# Patient Record
Sex: MALE | Race: WHITE
[De-identification: names, ages, dates, MRNs, and addresses within clinical notes are randomized per-mention and may not be internally consistent; named-entity substitution may affect disease eponyms.]

---

## 2017-05-30 ENCOUNTER — HOSPITAL ENCOUNTER (OUTPATIENT)
Dept: HOSPITAL 61 - 5 NORTH | Age: 77
Setting detail: OBSERVATION
LOS: 3 days | Discharge: HOME | End: 2017-06-02
Attending: INTERNAL MEDICINE | Admitting: INTERNAL MEDICINE
Payer: MEDICARE

## 2017-05-30 VITALS — SYSTOLIC BLOOD PRESSURE: 95 MMHG | DIASTOLIC BLOOD PRESSURE: 47 MMHG

## 2017-05-30 VITALS — SYSTOLIC BLOOD PRESSURE: 109 MMHG | DIASTOLIC BLOOD PRESSURE: 58 MMHG

## 2017-05-30 VITALS — DIASTOLIC BLOOD PRESSURE: 52 MMHG | SYSTOLIC BLOOD PRESSURE: 109 MMHG

## 2017-05-30 VITALS — BODY MASS INDEX: 19.1 KG/M2 | HEIGHT: 68 IN | WEIGHT: 126 LBS

## 2017-05-30 DIAGNOSIS — R63.4: ICD-10-CM

## 2017-05-30 DIAGNOSIS — Z79.899: ICD-10-CM

## 2017-05-30 DIAGNOSIS — K21.9: ICD-10-CM

## 2017-05-30 DIAGNOSIS — I10: ICD-10-CM

## 2017-05-30 DIAGNOSIS — E87.6: ICD-10-CM

## 2017-05-30 DIAGNOSIS — E86.0: ICD-10-CM

## 2017-05-30 DIAGNOSIS — A04.7: Primary | ICD-10-CM

## 2017-05-30 DIAGNOSIS — Z87.442: ICD-10-CM

## 2017-05-30 LAB
ALBUMIN SERPL-MCNC: 3 G/DL (ref 3.4–5)
ALBUMIN/GLOB SERPL: 0.8 {RATIO} (ref 1–1.7)
ALP SERPL-CCNC: 77 U/L (ref 46–116)
ALT SERPL-CCNC: 18 U/L (ref 16–63)
ANION GAP SERPL CALC-SCNC: 8 MMOL/L (ref 6–14)
AST SERPL-CCNC: 18 U/L (ref 15–37)
BACTERIA #/AREA URNS HPF: 0 /HPF
BASOPHILS # BLD AUTO: 0 X10^3/UL (ref 0–0.2)
BASOPHILS NFR BLD: 0 % (ref 0–3)
BILIRUB SERPL-MCNC: 0.7 MG/DL (ref 0.2–1)
BILIRUB UR QL STRIP: (no result)
BUN SERPL-MCNC: 15 MG/DL (ref 8–26)
BUN/CREAT SERPL: 12 (ref 6–20)
CALCIUM SERPL-MCNC: 8.4 MG/DL (ref 8.5–10.1)
CHLORIDE SERPL-SCNC: 101 MMOL/L (ref 98–107)
CO2 SERPL-SCNC: 29 MMOL/L (ref 21–32)
CREAT SERPL-MCNC: 1.3 MG/DL (ref 0.7–1.3)
EOSINOPHIL NFR BLD: 0 % (ref 0–3)
ERYTHROCYTE [DISTWIDTH] IN BLOOD BY AUTOMATED COUNT: 12.6 % (ref 11.5–14.5)
GFR SERPLBLD BASED ON 1.73 SQ M-ARVRAT: 53.8 ML/MIN
GLOBULIN SER-MCNC: 3.6 G/DL (ref 2.2–3.8)
GLUCOSE SERPL-MCNC: 104 MG/DL (ref 70–99)
GLUCOSE UR STRIP-MCNC: NEGATIVE MG/DL
HCT VFR BLD CALC: 34.2 % (ref 39–53)
HGB BLD-MCNC: 11.9 G/DL (ref 13–17.5)
LYMPHOCYTES # BLD: 1.3 X10^3/UL (ref 1–4.8)
LYMPHOCYTES NFR BLD AUTO: 11 % (ref 24–48)
MCH RBC QN AUTO: 30 PG (ref 25–35)
MCHC RBC AUTO-ENTMCNC: 35 G/DL (ref 31–37)
MCV RBC AUTO: 85 FL (ref 79–100)
MONOCYTES NFR BLD: 18 % (ref 0–9)
NEUTROPHILS NFR BLD AUTO: 71 % (ref 31–73)
NITRITE UR QL STRIP: NEGATIVE
PH UR STRIP: 5.5 [PH]
PLATELET # BLD AUTO: 284 X10^3/UL (ref 140–400)
POTASSIUM SERPL-SCNC: 4.2 MMOL/L (ref 3.5–5.1)
PROT SERPL-MCNC: 6.6 G/DL (ref 6.4–8.2)
PROT UR STRIP-MCNC: 30 MG/DL
RBC # BLD AUTO: 4.01 X10^6/UL (ref 4.3–5.7)
RBC #/AREA URNS HPF: 0 /HPF (ref 0–2)
SODIUM SERPL-SCNC: 138 MMOL/L (ref 136–145)
SP GR UR STRIP: 1.02
UROBILINOGEN UR-MCNC: 1 MG/DL
WBC # BLD AUTO: 12.2 X10^3/UL (ref 4–11)
WBC #/AREA URNS HPF: (no result) /HPF (ref 0–4)

## 2017-05-30 PROCEDURE — 80053 COMPREHEN METABOLIC PANEL: CPT

## 2017-05-30 PROCEDURE — 87045 FECES CULTURE AEROBIC BACT: CPT

## 2017-05-30 PROCEDURE — 71010: CPT

## 2017-05-30 PROCEDURE — 80048 BASIC METABOLIC PNL TOTAL CA: CPT

## 2017-05-30 PROCEDURE — 80162 ASSAY OF DIGOXIN TOTAL: CPT

## 2017-05-30 PROCEDURE — 96366 THER/PROPH/DIAG IV INF ADDON: CPT

## 2017-05-30 PROCEDURE — 83735 ASSAY OF MAGNESIUM: CPT

## 2017-05-30 PROCEDURE — 81001 URINALYSIS AUTO W/SCOPE: CPT

## 2017-05-30 PROCEDURE — 93005 ELECTROCARDIOGRAM TRACING: CPT

## 2017-05-30 PROCEDURE — 96361 HYDRATE IV INFUSION ADD-ON: CPT

## 2017-05-30 PROCEDURE — 84443 ASSAY THYROID STIM HORMONE: CPT

## 2017-05-30 PROCEDURE — 36415 COLL VENOUS BLD VENIPUNCTURE: CPT

## 2017-05-30 PROCEDURE — 96365 THER/PROPH/DIAG IV INF INIT: CPT

## 2017-05-30 PROCEDURE — 87040 BLOOD CULTURE FOR BACTERIA: CPT

## 2017-05-30 PROCEDURE — G0379 DIRECT REFER HOSPITAL OBSERV: HCPCS

## 2017-05-30 PROCEDURE — 85027 COMPLETE CBC AUTOMATED: CPT

## 2017-05-30 PROCEDURE — 96367 TX/PROPH/DG ADDL SEQ IV INF: CPT

## 2017-05-30 PROCEDURE — 87324 CLOSTRIDIUM AG IA: CPT

## 2017-05-30 PROCEDURE — G0378 HOSPITAL OBSERVATION PER HR: HCPCS

## 2017-05-30 PROCEDURE — 87329 GIARDIA AG IA: CPT

## 2017-05-30 RX ADMIN — ACETAMINOPHEN PRN MG: 325 TABLET, FILM COATED ORAL at 19:53

## 2017-05-30 RX ADMIN — PROPRANOLOL HYDROCHLORIDE SCH MG: 10 TABLET ORAL at 21:00

## 2017-05-30 RX ADMIN — DEXTROSE AND SODIUM CHLORIDE SCH MLS/HR: 5; .45 INJECTION, SOLUTION INTRAVENOUS at 17:03

## 2017-05-30 RX ADMIN — FAMOTIDINE SCH MG: 20 TABLET ORAL at 19:54

## 2017-05-30 NOTE — RAD
Exam:  AP portable chest. 



History:  Weight loss.



Comparison:  None.



Findings:  The heart and mediastinal structures are within normal limits for

size.  Lungs are without infiltrate.  No pneumothorax or pleural effusion is

appreciated.  Lung fields appear hyperinflated, may indicate obstructive lung

disease/emphysema.



Impression:  

1.  No acute cardiopulmonary process.  

2.  Hyperinflated lung fields.

## 2017-05-31 VITALS — DIASTOLIC BLOOD PRESSURE: 50 MMHG | SYSTOLIC BLOOD PRESSURE: 107 MMHG

## 2017-05-31 VITALS — DIASTOLIC BLOOD PRESSURE: 44 MMHG | SYSTOLIC BLOOD PRESSURE: 96 MMHG

## 2017-05-31 VITALS — SYSTOLIC BLOOD PRESSURE: 100 MMHG | DIASTOLIC BLOOD PRESSURE: 47 MMHG

## 2017-05-31 VITALS — SYSTOLIC BLOOD PRESSURE: 104 MMHG | DIASTOLIC BLOOD PRESSURE: 44 MMHG

## 2017-05-31 VITALS — SYSTOLIC BLOOD PRESSURE: 108 MMHG | DIASTOLIC BLOOD PRESSURE: 54 MMHG

## 2017-05-31 VITALS — DIASTOLIC BLOOD PRESSURE: 46 MMHG | SYSTOLIC BLOOD PRESSURE: 95 MMHG

## 2017-05-31 LAB
ANION GAP SERPL CALC-SCNC: 8 MMOL/L (ref 6–14)
BUN SERPL-MCNC: 12 MG/DL (ref 8–26)
CALCIUM SERPL-MCNC: 8.1 MG/DL (ref 8.5–10.1)
CHLORIDE SERPL-SCNC: 102 MMOL/L (ref 98–107)
CO2 SERPL-SCNC: 28 MMOL/L (ref 21–32)
CREAT SERPL-MCNC: 1.1 MG/DL (ref 0.7–1.3)
GFR SERPLBLD BASED ON 1.73 SQ M-ARVRAT: 65.3 ML/MIN
GLUCOSE SERPL-MCNC: 129 MG/DL (ref 70–99)
MAGNESIUM SERPL-MCNC: 2 MG/DL (ref 1.8–2.4)
POTASSIUM SERPL-SCNC: 3.4 MMOL/L (ref 3.5–5.1)
SODIUM SERPL-SCNC: 138 MMOL/L (ref 136–145)

## 2017-05-31 RX ADMIN — DEXTROSE AND SODIUM CHLORIDE SCH MLS/HR: 5; .45 INJECTION, SOLUTION INTRAVENOUS at 05:17

## 2017-05-31 RX ADMIN — FIDAXOMICIN SCH MG: 200 TABLET, FILM COATED ORAL at 20:42

## 2017-05-31 RX ADMIN — DEXTROSE, SODIUM CHLORIDE, AND POTASSIUM CHLORIDE SCH MLS/HR: 5; .45; .15 INJECTION INTRAVENOUS at 11:00

## 2017-05-31 RX ADMIN — PROPRANOLOL HYDROCHLORIDE SCH MG: 10 TABLET ORAL at 09:00

## 2017-05-31 RX ADMIN — FAMOTIDINE SCH MG: 20 TABLET ORAL at 20:42

## 2017-05-31 RX ADMIN — PROPRANOLOL HYDROCHLORIDE SCH MG: 10 TABLET ORAL at 20:43

## 2017-05-31 RX ADMIN — PROBIOTIC PRODUCT - TAB SCH TAB: TAB at 17:59

## 2017-05-31 RX ADMIN — FIDAXOMICIN SCH MG: 200 TABLET, FILM COATED ORAL at 12:32

## 2017-05-31 RX ADMIN — PROBIOTIC PRODUCT - TAB SCH TAB: TAB at 11:00

## 2017-05-31 RX ADMIN — DEXTROSE, SODIUM CHLORIDE, AND POTASSIUM CHLORIDE SCH MLS/HR: 5; .45; .15 INJECTION INTRAVENOUS at 23:34

## 2017-05-31 RX ADMIN — DIGOXIN SCH MCG: 125 TABLET ORAL at 09:00

## 2017-05-31 NOTE — HP
ADMIT DATE:  05/30/2017



LOCATION:  He is in room #532.



HISTORY OF PRESENT ILLNESS:  The patient is a 76-year-old white male, who has

had a 9-day history of liquid diarrhea, having about 12 loose stools a day, who

has had bilateral abdominal cramps and denied any vomiting.  He has no recent

travel, but he took antibiotics, perhaps clindamycin for 2 days on 04/26/2017

when he saw his dentist.  His appetite is decreased.  He has lost 14 pounds. 

The patient denies any blood in the stool.  He thinks he had a colonoscopy a few

years ago.  He was seen in the office yesterday and had a fever and was

subsequently admitted to the hospital for further evaluation and treatment.



ALLERGIES AND INTOLERANCES:  PENICILLIN, WHICH CAUSES HIVES.



MEDICATIONS:  Prior to admission, include amlodipine 5 mg every day, Flexeril 10

mg t.i.d. p.r.n., digoxin 125 mcg every day, losartan 50 mg every day,

propranolol 20 mg b.i.d., Protonix 40 mg every day, ranitidine 300 mg at

bedtime, and Xanax 0.25 mg one at bedtime p.r.n.



PAST MEDICAL HISTORY:  Significant for paroxysmal supraventricular tachycardia,

gastroesophageal reflux disease, and hypertension.  He also has a history of

anxiety.  He passed a kidney stone in the past.



PAST SURGICAL HISTORY:  He has had an appendectomy and tonsillectomy.



SOCIAL HISTORY:  He does not drink alcohol, nor does he smoke cigarettes.  He is

 and retired.



FAMILY HISTORY:  Noncontributory.



REVIEW OF SYSTEMS:

GENERAL:  He has had fever and some chills.

CARDIOVASCULAR:  No chest pain.

PULMONARY:  No cough or shortness of breath.

GASTROINTESTINAL:  He has had copious amounts of diarrhea.

NEUROLOGIC:  No focal weakness, but has generalized weakness.

ENDOCRINE:  No diabetes mellitus.

SKIN:  No rashes.

The rest of systems reviewed are negative, except as stated in the history of

present illness.



PHYSICAL EXAMINATION:

VITAL SIGNS:  Temperature is 99 degrees, apical pulse is 59, respiratory rate is

18, blood pressure is 104/44, and oxygen saturation 93% on room air. 

Temperature was 100.6 degrees last night on admission.

HEENT:  Eyes:  Gaze is conjugate.  Extraocular muscles are intact.  Mouth: 

Tongue is midline.

NECK:  There is no cervical lymphadenopathy or thyroid enlargement.

HEART:  Reveals an S1, S2.  There is no S3 or murmur.

LUNGS:  Clear.

ABDOMEN:  Bowel sounds are positive, soft, and not distended.  He has got some

tenderness in the left lower quadrant and also in the suprapubic region.

EXTREMITIES:  Lower extremities without edema.

SKIN:  No rashes.

NEUROLOGIC:  Coherent with no focal weakness of the extremities, or facial

asymmetry.



LABORATORY DATA:  His white count was increased to 12.2, hemoglobin 11.9, and

platelet count was 284,000, 31 polys, and 11 lymphocytes.  Sodium is 138,

potassium low at 3.4, chloride 102, total CO2 28, BUN 12 - was 15; and

creatinine 1.3 yesterday, it is 1.1 today.  His blood sugar is 129, was 140

yesterday; calcium 8.1, and magnesium 2.0.  Liver function tests were okay. 

Albumin was slightly low at 3.0.  TSH level was normal.  Urinalysis showed

occasional white cells, no red blood cell.  Digoxin level is 1.0.  Stool was

positive for Clostridium difficile toxin.  His chest x-ray showed no acute

abnormality with hyperinflated lung fields.  His electrocardiogram, his results

are not written. 



ASSESSMENT:

1.  Severe Clostridium difficile colitis.

2.  Weight loss secondary to Clostridium difficile colitis.

3.  Hypokalemia secondary to diarrhea.

4.  Gastroesophageal reflux disease.

5.  Hypertension.



PLAN:  To continue with his IV fluids and add potassium chloride to the IV

fluids - given 40 mEq of potassium chloride now.  He was treated with IV Flagyl

and vancomycin and discussed the case with the Infectious Disease consultant,

____ to be IV fluids, and we will write an order for Dificid and discontinue the

oral vancomycin and IV Flagyl.  We will also discontinue the Protonix because of

Clostridium difficile colitis and continue with his IV fluids with potassium

chloride, recheck his labs again tomorrow, and give him an extra dose of oral

potassium chloride.  I will have the  see if the Dificid can be

covered by his insurance company, otherwise it will be switched to oral

vancomycin.  Again, we will recheck his labs tomorrow.  We will hold his

losartan.  Continue the digoxin and propranolol for his paroxysmal

supraventricular tachycardia.  Discontinue Protonix.  We will continue the

Pepcid instead of the Zantac because of the formulary change.  As mentioned,

discontinue the amlodipine and losartan as his blood pressure is on the low

side.

 



______________________________

GINO CAN MD



DR:  ELENA/kendall  JOB#:  [id number] / [id number]

DD:  05/31/2017 10:24  DT:  05/31/2017 11:36

## 2017-05-31 NOTE — PDOC2
GI CONSULT


Reason For Consult:


Diarrhea, weight loss





HPI:


HPI:


77 y/o male, directly admitted by Dr. Salazar.  Reports 10 days of diarrhea, 

multiple watery stools during the night.  Has been fatigued w/ decreased 

appetite, a little nausea, as well w/ some intermittent LLQ "gas pains."  Was 

told he has lost 13 pounds since his last office visit.  Symptoms possibly 

precipitated by taking antibiotics for a dental infection.  Note his daughter 

works at a penit[employer]iary home.  Denies hematochezia, melena, vomiting.  H/o GERD 

on H2 blocker and PPI, believes well-controlled.  No previous EGD.  Colonoscopy 

was reportedly normal years ago.  No NSAID use, takes Tylenol for HA.





Found + C Diff here, T max 100.6, WBC 12.2, Hgb 11.9.  Was started on Flagyl 

and vanco, ID has since seen and Dificid has been suggested.





PMH:


PMH:


HTN, headache, appendectomy, also takes digoxin (?CHF ?A Fib)





FH:


Family History:  No pertinent hx





Social History:


Smoke:  No


ALCOHOL:  none


Drugs:  None





ROS:





GEN: +fever


HEENT: Denies blurred vision, sore throat


CV: Denies chest pain


RESP: Denies shortness of air, cough


GI: Per HPI


: Denies hematuria, dysuria


ENDO: +weight loss


NEURO: Denies confusion, dizziness


MSK: +weakness +fatigue


SKIN: Denies jaundice, pruritus





Vitals:


Vitals:





 Vital Signs








  Date Time  Temp Pulse Resp B/P (MAP) Pulse Ox O2 Delivery O2 Flow Rate FiO2


 


5/31/17 09:00  59  104/44    


 


5/31/17 07:00 99.0  16  93 Room Air  





 99.0       











Labs:


Labs:





Laboratory Tests








Test


  5/30/17


16:30 5/30/17


17:15 5/31/17


04:20


 


Urine Collection Type Unknown   


 


Urine Color Colette   


 


Urine Clarity Clear   


 


Urine pH 5.5   


 


Urine Specific Gravity 1.025   


 


Urine Protein


  30 mg/dL


(NEG-TRACE) 


  


 


 


Urine Glucose (UA)


  Negative mg/dL


(NEG) 


  


 


 


Urine Ketones (Stick)


  Trace mg/dL


(NEG) 


  


 


 


Urine Blood Negative (NEG)   


 


Urine Nitrite Negative (NEG)   


 


Urine Bilirubin Small (NEG)   


 


Urine Urobilinogen Dipstick


  1.0 mg/dL (0.2


mg/dL) 


  


 


 


Urine Leukocyte Esterase Trace (NEG)   


 


Urine RBC 0 /HPF (0-2)   


 


Urine WBC Occ /HPF (0-4)   


 


Urine Bacteria 0 /HPF (0-FEW)   


 


Urine Mucus Mod /LPF   


 


Clostridium difficile Toxin


(PCR) Positive


(Negative) 


  


 


 


White Blood Count


  


  12.2 x10^3/uL


(4.0-11.0) 


 


 


Red Blood Count


  


  4.01 x10^6/uL


(4.30-5.70) 


 


 


Hemoglobin


  


  11.9 g/dL


(13.0-17.5) 


 


 


Hematocrit


  


  34.2 %


(39.0-53.0) 


 


 


Mean Corpuscular Volume  85 fL ()  


 


Mean Corpuscular Hemoglobin  30 pg (25-35)  


 


Mean Corpuscular Hemoglobin


Concent 


  35 g/dL


(31-37) 


 


 


Red Cell Distribution Width


  


  12.6 %


(11.5-14.5) 


 


 


Platelet Count


  


  284 x10^3/uL


(140-400) 


 


 


Neutrophils (%) (Auto)  71 % (31-73)  


 


Lymphocytes (%) (Auto)  11 % (24-48)  


 


Monocytes (%) (Auto)  18 % (0-9)  


 


Eosinophils (%) (Auto)  0 % (0-3)  


 


Basophils (%) (Auto)  0 % (0-3)  


 


Neutrophils # (Auto)


  


  8.7 x10^3uL


(1.8-7.7) 


 


 


Lymphocytes # (Auto)


  


  1.3 x10^3/uL


(1.0-4.8) 


 


 


Monocytes # (Auto)


  


  2.2 x10^3/uL


(0.0-1.1) 


 


 


Eosinophils # (Auto)


  


  0.0 x10^3/uL


(0.0-0.7) 


 


 


Basophils # (Auto)


  


  0.0 x10^3/uL


(0.0-0.2) 


 


 


Sodium Level


  


  138 mmol/L


(136-145) 138 mmol/L


(136-145)


 


Potassium Level


  


  4.2 mmol/L


(3.5-5.1) 3.4 mmol/L


(3.5-5.1)


 


Chloride Level


  


  101 mmol/L


() 102 mmol/L


()


 


Carbon Dioxide Level


  


  29 mmol/L


(21-32) 28 mmol/L


(21-32)


 


Anion Gap  8 (6-14)  8 (6-14) 


 


Blood Urea Nitrogen


  


  15 mg/dL


(8-26) 12 mg/dL


(8-26)


 


Creatinine


  


  1.3 mg/dL


(0.7-1.3) 1.1 mg/dL


(0.7-1.3)


 


Estimated GFR


(Cockcroft-Gault) 


  53.8 


  65.3 


 


 


BUN/Creatinine Ratio  12 (6-20)  


 


Glucose Level


  


  104 mg/dL


(70-99) 129 mg/dL


(70-99)


 


Calcium Level


  


  8.4 mg/dL


(8.5-10.1) 8.1 mg/dL


(8.5-10.1)


 


Total Bilirubin


  


  0.7 mg/dL


(0.2-1.0) 


 


 


Aspartate Amino Transf


(AST/SGOT) 


  18 U/L (15-37) 


  


 


 


Alanine Aminotransferase


(ALT/SGPT) 


  18 U/L (16-63) 


  


 


 


Alkaline Phosphatase


  


  77 U/L


() 


 


 


Total Protein


  


  6.6 g/dL


(6.4-8.2) 


 


 


Albumin


  


  3.0 g/dL


(3.4-5.0) 


 


 


Albumin/Globulin Ratio  0.8 (1.0-1.7)  


 


Thyroid Stimulating Hormone


(TSH) 


  2.159 uIU/mL


(0.358-3.74) 


 


 


Magnesium Level


  


  


  2.0 mg/dL


(1.8-2.4)


 


Digoxin Level


  


  


  1.0 ng/mL


(0.9-2.0)


 


Digoxin Last Dose Date   05/30/17 


 


Digoxin Last Dose Time   0900 











Allergies:


Coded Allergies:  


     Penicillins (Verified  Allergy, Intermediate, HIVES, RASH, 5/30/17)





Medications:





Current Medications








 Medications


  (Trade)  Dose


 Ordered  Sig/Jacquelyn


 Route


 PRN Reason  Start Time


 Stop Time Status Last Admin


Dose Admin


 


 Pantoprazole


 Sodium


  (Protonix)  40 mg  DAILYAC


 PO


   5/31/17 07:30


    5/31/17 09:12


 


 


 Famotidine


  (Pepcid)  40 mg  QHS


 PO


   5/30/17 21:00


    5/30/17 19:54


 


 


 Acetaminophen


  (Tylenol)  650 mg  PRN Q6HRS  PRN


 PO


 MILD PAIN / TEMP  5/30/17 16:00


    5/30/17 19:53


 


 


 Dextrose/Sodium


 Chloride  1,000 ml @ 


 75 mls/hr  [id number]


 IV


   5/30/17 16:30


    5/31/17 05:17


 


 


 Metronidazole  100 ml @ 


 100 mls/hr  Q8HRS


 IV


   5/30/17 20:00


    5/31/17 05:15


 


 


 Vancomycin HCl  125 mg  [id number]


 PO


   5/31/17 09:00


    5/31/17 09:12


 











PE:





GEN: NAD, thin, pleasant


HEENT: Atraumatic, PERRL


LUNGS: CTAB anteriorly


HEART: bradycardic


ABD: NABS, S/ND, some LLQ to suprapubic discomfort, a little in RUQ


EXTREMITY: No edema


SKIN: No rashes, no jaundice


NEURO/PSYCH: A & O 3


OTHER: daughter present





A/P:


A/P:


C Diff


-first occurrence, recent antibiotic use + daughter works in healthcare


-atbx per ID, note Dificid suggestion





Diarrhea, lower abd discomfort, weight loss, fatigue





Fever, leukocytosis





CRC screen


-recalls one normal colonoscopy





GERD


-on PPI and H2 blocker





--


Continue antibiotics per ID.


Consider screening colonoscopy as outpatient.











MARGARITO ARRIAGA May 31, 2017 10:16

## 2017-05-31 NOTE — PDOC
Infectious Disease Note


ROS


ROS


GEN: Denies fevers, chills, sweats


HEENT: Denies blurred vision, sore throat


CV: Denies chest pain


RESP: Denies shortness of air, cough


GI: Denies n/v/d


NEURO: Denies confusion, dizziness


MSK: Denies weakness, joint pain/swelling





Vital Sign


Vital Signs





Vital Signs








  Date Time  Temp Pulse Resp B/P (MAP) Pulse Ox O2 Delivery O2 Flow Rate FiO2


 


5/31/17 07:00 99.0 59 16 104/44 (64) 93 Room Air  





 99.0       











Physical Exam


PHYSICAL EXAM


GENERAL:  NAD, Alert


HEENT:  PERRL, OC/OP


NECK:  Supple, no JVD, no LN


LUNGS:  Clear


HEART:  S1S2, no gallop, no murmur


ABD:  Soft, NT, no organomegaly, no rebound


EXT:  No edema, no cyanosis


CNS:  Alert, oriented x 3, no focal neurologic deficit


SKIN:  No rash


IV: ok





Labs


Lab





Laboratory Tests








Test


  5/30/17


16:30 5/30/17


17:15 5/31/17


04:20


 


Urine Collection Type Unknown   


 


Urine Color Colette   


 


Urine Clarity Clear   


 


Urine pH 5.5   


 


Urine Specific Gravity 1.025   


 


Urine Protein


  30 mg/dL


(NEG-TRACE) 


  


 


 


Urine Glucose (UA)


  Negative mg/dL


(NEG) 


  


 


 


Urine Ketones (Stick)


  Trace mg/dL


(NEG) 


  


 


 


Urine Blood Negative (NEG)   


 


Urine Nitrite Negative (NEG)   


 


Urine Bilirubin Small (NEG)   


 


Urine Urobilinogen Dipstick


  1.0 mg/dL (0.2


mg/dL) 


  


 


 


Urine Leukocyte Esterase Trace (NEG)   


 


Urine RBC 0 /HPF (0-2)   


 


Urine WBC Occ /HPF (0-4)   


 


Urine Bacteria 0 /HPF (0-FEW)   


 


Urine Mucus Mod /LPF   


 


Clostridium difficile Toxin


(PCR) Positive


(Negative) 


  


 


 


White Blood Count


  


  12.2 x10^3/uL


(4.0-11.0) 


 


 


Red Blood Count


  


  4.01 x10^6/uL


(4.30-5.70) 


 


 


Hemoglobin


  


  11.9 g/dL


(13.0-17.5) 


 


 


Hematocrit


  


  34.2 %


(39.0-53.0) 


 


 


Mean Corpuscular Volume  85 fL ()  


 


Mean Corpuscular Hemoglobin  30 pg (25-35)  


 


Mean Corpuscular Hemoglobin


Concent 


  35 g/dL


(31-37) 


 


 


Red Cell Distribution Width


  


  12.6 %


(11.5-14.5) 


 


 


Platelet Count


  


  284 x10^3/uL


(140-400) 


 


 


Neutrophils (%) (Auto)  71 % (31-73)  


 


Lymphocytes (%) (Auto)  11 % (24-48)  


 


Monocytes (%) (Auto)  18 % (0-9)  


 


Eosinophils (%) (Auto)  0 % (0-3)  


 


Basophils (%) (Auto)  0 % (0-3)  


 


Neutrophils # (Auto)


  


  8.7 x10^3uL


(1.8-7.7) 


 


 


Lymphocytes # (Auto)


  


  1.3 x10^3/uL


(1.0-4.8) 


 


 


Monocytes # (Auto)


  


  2.2 x10^3/uL


(0.0-1.1) 


 


 


Eosinophils # (Auto)


  


  0.0 x10^3/uL


(0.0-0.7) 


 


 


Basophils # (Auto)


  


  0.0 x10^3/uL


(0.0-0.2) 


 


 


Sodium Level


  


  138 mmol/L


(136-145) 138 mmol/L


(136-145)


 


Potassium Level


  


  4.2 mmol/L


(3.5-5.1) 3.4 mmol/L


(3.5-5.1)


 


Chloride Level


  


  101 mmol/L


() 102 mmol/L


()


 


Carbon Dioxide Level


  


  29 mmol/L


(21-32) 28 mmol/L


(21-32)


 


Anion Gap  8 (6-14)  8 (6-14) 


 


Blood Urea Nitrogen


  


  15 mg/dL


(8-26) 12 mg/dL


(8-26)


 


Creatinine


  


  1.3 mg/dL


(0.7-1.3) 1.1 mg/dL


(0.7-1.3)


 


Estimated GFR


(Cockcroft-Gault) 


  53.8 


  65.3 


 


 


BUN/Creatinine Ratio  12 (6-20)  


 


Glucose Level


  


  104 mg/dL


(70-99) 129 mg/dL


(70-99)


 


Calcium Level


  


  8.4 mg/dL


(8.5-10.1) 8.1 mg/dL


(8.5-10.1)


 


Total Bilirubin


  


  0.7 mg/dL


(0.2-1.0) 


 


 


Aspartate Amino Transf


(AST/SGOT) 


  18 U/L (15-37) 


  


 


 


Alanine Aminotransferase


(ALT/SGPT) 


  18 U/L (16-63) 


  


 


 


Alkaline Phosphatase


  


  77 U/L


() 


 


 


Total Protein


  


  6.6 g/dL


(6.4-8.2) 


 


 


Albumin


  


  3.0 g/dL


(3.4-5.0) 


 


 


Albumin/Globulin Ratio  0.8 (1.0-1.7)  


 


Thyroid Stimulating Hormone


(TSH) 


  2.159 uIU/mL


(0.358-3.74) 


 


 


Magnesium Level


  


  


  2.0 mg/dL


(1.8-2.4)


 


Digoxin Level


  


  


  1.0 ng/mL


(0.9-2.0)


 


Digoxin Last Dose Date   05/30/17 


 


Digoxin Last Dose Time   0900 











Objective


Assessment


C-diff + 5/30. He dose have a daughter who works at [employer] home


Fever


Leukocytosis


PCN allergy - Has tolerated Amox





Plan


Plan of Care


Change to Dificid -at risk for reoccurrence


Probiotics


 to check out Dificid cost


Home when not at risk for dehydration





Thank you





D/w family





# [id number]











KE VALLES MD May 31, 2017 09:15

## 2017-05-31 NOTE — PDOC
Provider Note


Provider Note


history and physical dictated  # [id number]











GINO CAN MD May 31, 2017 10:25

## 2017-05-31 NOTE — EKG
[facility]

              [address] [place] [address]

Test Date:    2017               Test Time:    09:45:53

Pat Name:     GINO NGUYEN           Department:   

Patient ID:   [id number]           Room:         532 1

Gender:       M                        Technician:   ADRIEL

:          1940               Requested By: GINO CAN

Order Number: [id number]            Reading MD:   Stu Worthy

                                 Measurements

Intervals                              Axis          

Rate:         61                       P:            62

PA:           164                      QRS:          64

QRSD:         84                       T:            -18

QT:           382                                    

QTc:          386                                    

                           Interpretive Statements

SINUS RHYTHM

QRS(T) CONTOUR ABNORMALITY

CONSIDER ANTEROSEPTAL MYOCARDIAL DAMAGE

ST & T ABNORMALITY, CONSIDER

ANTERIOR ISCHEMIA OR LEFT VENTRICULAR STRAIN

CANNOT RULE OUT DIG SIDE-EFFECT

Electronically Signed On 2017 9:25:16 CDT by Stu Worthy

## 2017-06-01 VITALS — DIASTOLIC BLOOD PRESSURE: 50 MMHG | SYSTOLIC BLOOD PRESSURE: 111 MMHG

## 2017-06-01 VITALS — SYSTOLIC BLOOD PRESSURE: 142 MMHG | DIASTOLIC BLOOD PRESSURE: 72 MMHG

## 2017-06-01 VITALS — DIASTOLIC BLOOD PRESSURE: 52 MMHG | SYSTOLIC BLOOD PRESSURE: 106 MMHG

## 2017-06-01 VITALS — SYSTOLIC BLOOD PRESSURE: 116 MMHG | DIASTOLIC BLOOD PRESSURE: 56 MMHG

## 2017-06-01 VITALS — SYSTOLIC BLOOD PRESSURE: 110 MMHG | DIASTOLIC BLOOD PRESSURE: 62 MMHG

## 2017-06-01 VITALS — SYSTOLIC BLOOD PRESSURE: 132 MMHG | DIASTOLIC BLOOD PRESSURE: 60 MMHG

## 2017-06-01 LAB
ANION GAP SERPL CALC-SCNC: 8 MMOL/L (ref 6–14)
BASOPHILS # BLD AUTO: 0 X10^3/UL (ref 0–0.2)
BASOPHILS NFR BLD: 0 % (ref 0–3)
BUN SERPL-MCNC: 9 MG/DL (ref 8–26)
CALCIUM SERPL-MCNC: 7.8 MG/DL (ref 8.5–10.1)
CHLORIDE SERPL-SCNC: 106 MMOL/L (ref 98–107)
CO2 SERPL-SCNC: 27 MMOL/L (ref 21–32)
CREAT SERPL-MCNC: 1 MG/DL (ref 0.7–1.3)
EOSINOPHIL NFR BLD: 2 % (ref 0–3)
ERYTHROCYTE [DISTWIDTH] IN BLOOD BY AUTOMATED COUNT: 12.4 % (ref 11.5–14.5)
GFR SERPLBLD BASED ON 1.73 SQ M-ARVRAT: 72.8 ML/MIN
GLUCOSE SERPL-MCNC: 117 MG/DL (ref 70–99)
HCT VFR BLD CALC: 31.8 % (ref 39–53)
HGB BLD-MCNC: 10.9 G/DL (ref 13–17.5)
LYMPHOCYTES # BLD: 1.2 X10^3/UL (ref 1–4.8)
LYMPHOCYTES NFR BLD AUTO: 16 % (ref 24–48)
MAGNESIUM SERPL-MCNC: 2 MG/DL (ref 1.8–2.4)
MCH RBC QN AUTO: 30 PG (ref 25–35)
MCHC RBC AUTO-ENTMCNC: 34 G/DL (ref 31–37)
MCV RBC AUTO: 87 FL (ref 79–100)
MONOCYTES NFR BLD: 17 % (ref 0–9)
NEUTROPHILS NFR BLD AUTO: 65 % (ref 31–73)
PLATELET # BLD AUTO: 243 X10^3/UL (ref 140–400)
POTASSIUM SERPL-SCNC: 3.9 MMOL/L (ref 3.5–5.1)
RBC # BLD AUTO: 3.66 X10^6/UL (ref 4.3–5.7)
SODIUM SERPL-SCNC: 141 MMOL/L (ref 136–145)
WBC # BLD AUTO: 7.7 X10^3/UL (ref 4–11)

## 2017-06-01 RX ADMIN — PROBIOTIC PRODUCT - TAB SCH TAB: TAB at 11:59

## 2017-06-01 RX ADMIN — FAMOTIDINE SCH MG: 20 TABLET ORAL at 20:25

## 2017-06-01 RX ADMIN — PROPRANOLOL HYDROCHLORIDE SCH MG: 10 TABLET ORAL at 08:37

## 2017-06-01 RX ADMIN — DIGOXIN SCH MCG: 125 TABLET ORAL at 02:37

## 2017-06-01 RX ADMIN — ACETAMINOPHEN PRN MG: 325 TABLET, FILM COATED ORAL at 02:37

## 2017-06-01 RX ADMIN — PROBIOTIC PRODUCT - TAB SCH TAB: TAB at 17:05

## 2017-06-01 RX ADMIN — PROBIOTIC PRODUCT - TAB SCH TAB: TAB at 08:35

## 2017-06-01 RX ADMIN — DEXTROSE, SODIUM CHLORIDE, AND POTASSIUM CHLORIDE SCH MLS/HR: 5; .45; .15 INJECTION INTRAVENOUS at 12:03

## 2017-06-01 RX ADMIN — FIDAXOMICIN SCH MG: 200 TABLET, FILM COATED ORAL at 20:25

## 2017-06-01 RX ADMIN — FIDAXOMICIN SCH MG: 200 TABLET, FILM COATED ORAL at 08:35

## 2017-06-01 RX ADMIN — PROPRANOLOL HYDROCHLORIDE SCH MG: 10 TABLET ORAL at 20:26

## 2017-06-01 RX ADMIN — ACETAMINOPHEN PRN MG: 325 TABLET, FILM COATED ORAL at 16:15

## 2017-06-01 NOTE — PDOC
Infectious Disease Note


Subjective


Subjective


Better.  hungry - less loose stool





ROS


ROS


GEN: Denies fevers, chills, sweats


HEENT: Denies blurred vision, sore throat


CV: Denies chest pain


RESP: Denies shortness of air, cough


GI: Denies n/v/d


NEURO: Denies confusion, dizziness


MSK: Denies weakness, joint pain/swelling





Vital Sign


Vital Signs





Vital Signs








  Date Time  Temp Pulse Resp B/P (MAP) Pulse Ox O2 Delivery O2 Flow Rate FiO2


 


6/1/17 10:44 97.5 58 16 111/50 (70) 98 Room Air  





 97.5       











Physical Exam


PHYSICAL EXAM


GENERAL:  NAD, Alert, thin


HEENT:  PERRL, OC/OP- clear


NECK:  Supple, no JVD, no LN


LUNGS:  Clear


HEART:  S1S2, no gallop, no murmur


ABD:  Soft, NT, no organomegaly, no rebound


EXT:  No edema, no cyanosis


CNS:  Alert, oriented x 3, no focal neurologic deficit


SKIN:  No rash


IV: ok





Labs


Lab





Laboratory Tests








Test


  6/1/17


03:58 6/1/17


04:00


 


Sodium Level


  141 mmol/L


(136-145) 


 


 


Potassium Level


  3.9 mmol/L


(3.5-5.1) 


 


 


Chloride Level


  106 mmol/L


() 


 


 


Carbon Dioxide Level


  27 mmol/L


(21-32) 


 


 


Anion Gap 8 (6-14)  


 


Blood Urea Nitrogen 9 mg/dL (8-26)  


 


Creatinine


  1.0 mg/dL


(0.7-1.3) 


 


 


Estimated GFR


(Cockcroft-Gault) 72.8 


  


 


 


Glucose Level


  117 mg/dL


(70-99) 


 


 


Calcium Level


  7.8 mg/dL


(8.5-10.1) 


 


 


Magnesium Level


  2.0 mg/dL


(1.8-2.4) 


 


 


White Blood Count


  


  7.7 x10^3/uL


(4.0-11.0)


 


Red Blood Count


  


  3.66 x10^6/uL


(4.30-5.70)


 


Hemoglobin


  


  10.9 g/dL


(13.0-17.5)


 


Hematocrit


  


  31.8 %


(39.0-53.0)


 


Mean Corpuscular Volume  87 fL () 


 


Mean Corpuscular Hemoglobin  30 pg (25-35) 


 


Mean Corpuscular Hemoglobin


Concent 


  34 g/dL


(31-37)


 


Red Cell Distribution Width


  


  12.4 %


(11.5-14.5)


 


Platelet Count


  


  243 x10^3/uL


(140-400)


 


Neutrophils (%) (Auto)  65 % (31-73) 


 


Lymphocytes (%) (Auto)  16 % (24-48) 


 


Monocytes (%) (Auto)  17 % (0-9) 


 


Eosinophils (%) (Auto)  2 % (0-3) 


 


Basophils (%) (Auto)  0 % (0-3) 


 


Neutrophils # (Auto)


  


  4.9 x10^3uL


(1.8-7.7)


 


Lymphocytes # (Auto)


  


  1.2 x10^3/uL


(1.0-4.8)


 


Monocytes # (Auto)


  


  1.3 x10^3/uL


(0.0-1.1)


 


Eosinophils # (Auto)


  


  0.1 x10^3/uL


(0.0-0.7)


 


Basophils # (Auto)


  


  0.0 x10^3/uL


(0.0-0.2)











Objective


Assessment


C-diff + 5/30. He dose have a daughter who works at long term home


Fever


Leukocytosis


PCN allergy - Has tolerated Amox





Plan


Plan of Care


Cont Dificid -at risk for reoccurrence


Probiotics


 to check out Dificid cost. d/w this am


Home when not at risk for dehydration





d/w daughter











KE VALLES MD Jun 1, 2017 11:47

## 2017-06-01 NOTE — PDOC
Subjective:


Subjective:


Less diarrhea, less abd discomfort.


Eating some, feeling better.





Objective:


Vital Signs:





 Vital Signs








  Date Time  Temp Pulse Resp B/P (MAP) Pulse Ox O2 Delivery O2 Flow Rate FiO2


 


6/1/17 10:44 97.5 58 16 111/50 (70) 98 Room Air  





 97.5       








Labs:





Laboratory Tests








Test


  6/1/17


03:58 6/1/17


04:00


 


Sodium Level 141 mmol/L  


 


Potassium Level 3.9 mmol/L  


 


Chloride Level 106 mmol/L  


 


Carbon Dioxide Level 27 mmol/L  


 


Anion Gap 8  


 


Blood Urea Nitrogen 9 mg/dL  


 


Creatinine 1.0 mg/dL  


 


Estimated GFR


(Cockcroft-Gault) 72.8 


  


 


 


Glucose Level 117 mg/dL  


 


Calcium Level 7.8 mg/dL  


 


Magnesium Level 2.0 mg/dL  


 


White Blood Count  7.7 x10^3/uL 


 


Red Blood Count  3.66 x10^6/uL 


 


Hemoglobin  10.9 g/dL 


 


Hematocrit  31.8 % 


 


Mean Corpuscular Volume  87 fL 


 


Mean Corpuscular Hemoglobin  30 pg 


 


Mean Corpuscular Hemoglobin


Concent 


  34 g/dL 


 


 


Red Cell Distribution Width  12.4 % 


 


Platelet Count  243 x10^3/uL 


 


Neutrophils (%) (Auto)  65 % 


 


Lymphocytes (%) (Auto)  16 % 


 


Monocytes (%) (Auto)  17 % 


 


Eosinophils (%) (Auto)  2 % 


 


Basophils (%) (Auto)  0 % 


 


Neutrophils # (Auto)  4.9 x10^3uL 


 


Lymphocytes # (Auto)  1.2 x10^3/uL 


 


Monocytes # (Auto)  1.3 x10^3/uL 


 


Eosinophils # (Auto)  0.1 x10^3/uL 


 


Basophils # (Auto)  0.0 x10^3/uL 











PE:





GEN: NAD


LUNGS: clear anteriorly


HEART: bradycardic


ABD: mild LLQ discomfort - better


NEURO/PSYCH: A & O 3





A/P:


C Diff


-admitted w/ diarrhea, lower abd discomfort, weight loss, fatigue


-first occurrence, on Dificid and lactobacillus per ID





Fever, leukocytosis - resolved





GERD


-on H2 blocker.





--


Continue antibiotics per ID.


Dr. Salazar has orders to advance diet tomorrow.











MARGARITO ARRIAGA Jun 1, 2017 12:28

## 2017-06-01 NOTE — ACF
Admission Forms Criteria


                                                                           


                        GASTROENTEROLOGY GRG





Clinical Indications for Admission to Inpatient Care





                                                                                

   (Place 'X' for any and all applicable criteria):


                     


Hospital admission is needed for appropriate care of the patient because of  

ANY ONE of the following: 


[ ]I.   Hemoperitoneum(7) 


[ ]II.  Ascites requiring acute treatment indicated by ANY ONE of the following(

8)(9):


        [ ]a)   Hemodynamic instability remaining after emergency or 

observation level care (as appropriate)


        [ ]b)   Peritoneal signs present (eg, abdominal rigidity, rebound 

tenderness, absent bowel sounds)


        [ ]c)   Tachypnea, Hypoxemia, or other respiratory symptoms remain 

after emergency or observation 


                 level care (as appropriate)


        [ ]d)   Suspected infected ascites as indicated by ANY ONE of the 

following:


                [ ]i)   Temperature greater than 100 degrees F (37.8 degrees C)


                [ ]ii)   Abdominal pain or tenderness not relieved by 

paracentesis


                [ ]iii)   Systemic signs of infection (eg, elevated WBC count, 

fever)


                [ ]iv)   Ascitic fluid analysis consistent with infection ( eg, 

elevated WBC count):


                [ ]v)   Vital sign abnormality


[ ]III.  Suspected acute intra-abdominal process indicated by ANY ONE of the 

following(1)(2)(3)(4)(5):


        [ ]a)  Hemodynamic instability 


        [ ]b)  Peritoneal signs present (eg, abdominal rigidity, rebound 

tenderness, absent bowel sounds) 


        [ ]c)  Bowel obstruction suspected (eg, severe vomiting, abdominal 

distension)


        [ ]d)  Suspected mesenteric ischemia or ischemic colitis(6)


        [ ]e)  Other signs or symptoms of acute abdominal disease (eg, severe 

pain, free air):


[ ]IV. Severe liver disease indicated by ANY ONE of the following(8)(9)(10)(11)(

12)(13)(14):


        [ ]a)  Acute hepatitis (eg, transaminase level greater than 1000 IU/L)


        [ ]b)  Acute elevation of prothrombin time to more than 50% above 

normal or INR greater than 1.5


        [ ]c)  Bilirubin greater than 20 mg/dL (342 micromoles/L) (15)


        [ ]d)  New-onset or worsening hepatic encephalopathy 


        [ ]e)  Acute liver necrosis


        [ ]f)   Vomiting or dehydration that is severe of persistent


        [ ]g)  Hemodynamic instability due to liver disease


        [ ]h)  Acute renal failure


        [ ]i)   Hepatic abscess


        [ ]j)   Dehydration that is severe or persistent


        [ ]k)  Hepatic hydrothorax(21)


        [ ]l)   Other indications of severe liver disease (eg, persistent fever

, ingestion of hepatotoxin)


[X ]V. Severe diarrhea indicated by ANY ONE of the following(17)(18)(19)(20)(21)

(22)(23):


        [ ]a)  High fever or other high-risk infection situation


        [ ]b)  Intractable bloody diarrhea (eg, more than 6 bloody stools per 

day)


        [X ]c)  Suspected Clostridium difficile-associated diarrhea(24)


        [ ]d)  Change in mental status that persists after emergency or 

observation level care (as appropriate)


        [ ]e)  Severe dehydration (eg, greater than 9% loss of body weight in 

children)


        [ ]f)   Inability to maintain hydration


        [ ]g)  Peritoneal signs present (eg, abdominal rigidity, rebound 

tenderness, absent bowel sounds)


        [ ]h)  Abdominal ischemia suspected(6)


        [ ]i)   Hemodynamic instability that persists after emergency or 

observation level care (as appropriate)


        [ ]j)   Severe electrolyte abnormalities requiring inpatient care


        [ ]k)  Acute renal failure


[ ]VI. Suspected toxic megacolon(5)(6)


[ ]VII.Severe dysphagia indicated by ANY ONE of the following(25)(26):


        [ ]a)  Suspected esophageal perforation or fistula(27)


        [ ]b)  Suspected cause that requires inpatient care (eg, caustic 

ingestion, severe esophagitis) (28)


        [ ]c)  Severe dehydration (eg, greater than 9% loss of body weight in 

children)


        [ ]d)  Inability to manage secretions or maintain hydration


        [ ]e)  Hemodynamic instability that persists after emergency or 

observation level care (as appropriate)


        [ ]f)   Severe electrolyte abnormalities requiring inpatient care


        [ ]g)  Acute renal failure


[ ]VIII.Vomiting and ANY ONE of the following (29)(30)(31)(32):


        [ ]a)  High fever or other high-risk infection situation


        [ ]b)  Change in mental status that persists after emergency or 

observation level care (as appropriate)


        [ ]c)  Severe dehydration (e.g., greater than 9% loss of body weight in 

children)


        [ ]d)  Peritoneal signs present (e.g., abdominal rigidity, rebound 

tenderness, absent bowel sounds)


        [ ]e)  Hemodynamic instability that persists after emergency or 

observation level care (as appropriate)


        [ ]f)   Severe electrolyte abnormalities requiring inpatient care


        [ ]g)  Acute renal failure


        [ ]h)  Bowel obstruction suspected (e.g., severe vomiting, abdominal 

distension)


        [ ]i)   Vomiting that is severe or persistent after medical treatment


[ ]IX. Significant dehydration indicated by ANY ONE of the following(23)(24)(25)


        [ ]a)  Clinical findings of severe dehydration indicated by ANY ONE of 

the following:


                [ ]i)    Acute loss of weight from baseline (5% of body weight 

in adults, 9% in pediatric patients)


                [ ]ii)   Hemodynamic instability


                [ ]iii)  Acute renal failure


                [ ]iv)  Serum sodium greater than 150 mEq/L (mmol/L)  


       [ ]b)   Dehydration that is persistent indicated by ALL of the following:


                [ ]i)   Oral rehydration therapy not tolerated or insufficient 

to adequately correct dehydration


                [ ]ii)  Appropriate intravenous treatment (eg, fluids) does not 

readily correct dehydration hours of  (ie, after 12 to 24 of treatment)  


[ ]X.  Gastroparesis and ANY ONE of the following(37)(38)(39): 


        [ ]a)  Dehydration that is severe or persistent


        [ ]b)  Severe electrolyte abnormalities requiring inpatient care 


        [ ]c)  Acute renal failure


        [ ]d)  Vomiting that is severe or persistent


[ ]XI. Complications of transplanted liver indicated by ANY ONE of the following

(40)(41):


        [ ]a)   Acute graft rejection requiring inpatient management (eg, 

intravenous immunosuppression)(42)


        [ ]b)   Failure of transplanted liver as indicated by ANY ONE of the 

following:


                 [ ]i)    Acute hepatitis (eg, transaminase level greater than 

1000 International Units per liter (IU/L))


                 [ ]ii)   Acute elevation of prothrombin time to more than 50% 

above baseline or INR greater than 1.5


                 [ ]iii)  Bilirubin greater than 20 mg/dL (342 micromoles/L)


                 [ ]iv)  New-onset or worsening hepatic encephalopathy


                 [ ]v)   Acute elevation of serum ammonia level (eg, greater 

than 210 mcg/dL (150 micromoles/L))


                 [ ]vi)  Acute liver necrosis


        [ ]c)   Infection requiring inpatient management (eg, Hemodynamic 

instability, need for intravenous antimicrobial               


                 treatment)(43)(44)(45)(46)(47)(48)(49)(50)


        [ ]d)   Other complication of transplanted liver (eg, thrombosis, 

autoimmune hepatitis, variceal bleeding) requiring inpatient management(51)(52) 


[ ]XII  Complications of transplanted pancreas indicated by ANY ONE of the 

following(53):


        [ ]a)  Acute graft rejection requiring inpatient management (eg, 

intravenous immunosuppression)(42)(54)


        [ ]b)  Failure of transplanted pancreas as indicated by ANY ONE of the 

following: 


                [ ]i)   Serum amylase greater than 3 times the upper limit of 

normal or baseline


                [ ]ii)   Serum lipase greater than 3 times the upper limit of 

normal or baseline 


                [ ]iii)  Imaging findings consistent with pancreatic 

inflammation or necrosis  


        [ ]c)  Infection requiring inpatient management (eg, Hemodynamic 

instability, need for intravenous antimicrobial               


                treatment)(43)(44)(45)(46)(47)(48)(49)(50)


        [ ]d)  Other complication of transplanted liver (eg, thrombosis, 

autoimmune hepatitis, variceal bleeding) requiring inpatient management(51)(52)

   


[ ]X.  Gastroenterology condition and ALL of the following:


        [ ]a)  Symptom or finding for which emergency and observation care have 

failed or are not considered 


                appropriate (Also use General Criteria: Observation Care as   

appropriate)


        [ ]b)  Presence of ANY ONE of the following:


               [ ]i)    A General Admission Criteria


               [ ]ii)   A Pediatric General Admission Criteria.


        





The original [facility] [employer] content created by [facility] has been revised. 


The portions of the  content which have been revised are identified through the 

use of italic text or in bold,and [facility] 


has neither reviewed  nor approved the modified material. All other unmodified 

content is copyright  [facility].





Please see references footnoted in the original [facility] edition 

2016





Admission Criteria Met?:  Yes











AMANDA GREGORY Jun 1, 2017 03:06

## 2017-06-01 NOTE — PDOC
PROGRESS NOTES


Subjective


Subjective


feels better. diarrhea less yesterday. lab reviewed.





Objective


Objective





Vital Signs








  Date Time  Temp Pulse Resp B/P (MAP) Pulse Ox O2 Delivery O2 Flow Rate FiO2


 


6/1/17 10:44 97.5 58 16 111/50 (70) 98 Room Air  





 97.5       














Intake and Output 


 


 6/1/17





 07:00


 


Intake Total 3435 ml


 


Balance 3435 ml


 


 


 


Intake Oral 2450 ml


 


IV Total 985 ml


 


# Voids 2











Physical Exam


Abdomen:  Soft, No tenderness


Heart:  Regular rate, Normal S1, Normal S2


Extremities:  No edema


General:  Alert


HEENT:  Atraumatic


Lungs:  Clear to auscultation


Neuro:  Normal speech


Psych/Mental Status:  Mental status NL


Skin:  No rashes





Assessment


Assessment


1.  Severe Clostridium difficile colitis. better


2.  Weight loss secondary to Clostridium difficile colitis.


3.  Hypokalemia secondary to diarrhea. resolved


4.  Gastroesophageal reflux disease.


5.  Hypertension.





Plan


Plan of Care


continue dificid


decrease iv fluids


advance to full liquids


advance to soft solids tomorrow


anticipate dismissal tomorrow if diarrhea under control with diet advancement


lab tomorrow





Comment


Review of Relevant


I have reviewed the following items enrrique (where applicable) has been applied.


Labs





Laboratory Tests








Test


  5/30/17


16:30 5/30/17


17:15 5/31/17


04:20 6/1/17


03:58


 


Urine Collection Type Unknown    


 


Urine Color Colette    


 


Urine Clarity Clear    


 


Urine pH 5.5    


 


Urine Specific Gravity 1.025    


 


Urine Protein


  30 mg/dL


(NEG-TRACE) 


  


  


 


 


Urine Glucose (UA)


  Negative mg/dL


(NEG) 


  


  


 


 


Urine Ketones (Stick)


  Trace mg/dL


(NEG) 


  


  


 


 


Urine Blood Negative (NEG)    


 


Urine Nitrite Negative (NEG)    


 


Urine Bilirubin Small (NEG)    


 


Urine Urobilinogen Dipstick


  1.0 mg/dL (0.2


mg/dL) 


  


  


 


 


Urine Leukocyte Esterase Trace (NEG)    


 


Urine RBC 0 /HPF (0-2)    


 


Urine WBC Occ /HPF (0-4)    


 


Urine Bacteria 0 /HPF (0-FEW)    


 


Urine Mucus Mod /LPF    


 


Clostridium difficile Toxin


(PCR) Positive


(Negative) 


  


  


 


 


Giardia Antigen


  Negative


(Negative) 


  


  


 


 


White Blood Count


  


  12.2 x10^3/uL


(4.0-11.0) 


  


 


 


Red Blood Count


  


  4.01 x10^6/uL


(4.30-5.70) 


  


 


 


Hemoglobin


  


  11.9 g/dL


(13.0-17.5) 


  


 


 


Hematocrit


  


  34.2 %


(39.0-53.0) 


  


 


 


Mean Corpuscular Volume  85 fL ()   


 


Mean Corpuscular Hemoglobin  30 pg (25-35)   


 


Mean Corpuscular Hemoglobin


Concent 


  35 g/dL


(31-37) 


  


 


 


Red Cell Distribution Width


  


  12.6 %


(11.5-14.5) 


  


 


 


Platelet Count


  


  284 x10^3/uL


(140-400) 


  


 


 


Neutrophils (%) (Auto)  71 % (31-73)   


 


Lymphocytes (%) (Auto)  11 % (24-48)   


 


Monocytes (%) (Auto)  18 % (0-9)   


 


Eosinophils (%) (Auto)  0 % (0-3)   


 


Basophils (%) (Auto)  0 % (0-3)   


 


Neutrophils # (Auto)


  


  8.7 x10^3uL


(1.8-7.7) 


  


 


 


Lymphocytes # (Auto)


  


  1.3 x10^3/uL


(1.0-4.8) 


  


 


 


Monocytes # (Auto)


  


  2.2 x10^3/uL


(0.0-1.1) 


  


 


 


Eosinophils # (Auto)


  


  0.0 x10^3/uL


(0.0-0.7) 


  


 


 


Basophils # (Auto)


  


  0.0 x10^3/uL


(0.0-0.2) 


  


 


 


Sodium Level


  


  138 mmol/L


(136-145) 138 mmol/L


(136-145) 141 mmol/L


(136-145)


 


Potassium Level


  


  4.2 mmol/L


(3.5-5.1) 3.4 mmol/L


(3.5-5.1) 3.9 mmol/L


(3.5-5.1)


 


Chloride Level


  


  101 mmol/L


() 102 mmol/L


() 106 mmol/L


()


 


Carbon Dioxide Level


  


  29 mmol/L


(21-32) 28 mmol/L


(21-32) 27 mmol/L


(21-32)


 


Anion Gap  8 (6-14)  8 (6-14)  8 (6-14) 


 


Blood Urea Nitrogen


  


  15 mg/dL


(8-26) 12 mg/dL


(8-26) 9 mg/dL (8-26) 


 


 


Creatinine


  


  1.3 mg/dL


(0.7-1.3) 1.1 mg/dL


(0.7-1.3) 1.0 mg/dL


(0.7-1.3)


 


Estimated GFR


(Cockcroft-Gault) 


  53.8 


  65.3 


  72.8 


 


 


BUN/Creatinine Ratio  12 (6-20)   


 


Glucose Level


  


  104 mg/dL


(70-99) 129 mg/dL


(70-99) 117 mg/dL


(70-99)


 


Calcium Level


  


  8.4 mg/dL


(8.5-10.1) 8.1 mg/dL


(8.5-10.1) 7.8 mg/dL


(8.5-10.1)


 


Total Bilirubin


  


  0.7 mg/dL


(0.2-1.0) 


  


 


 


Aspartate Amino Transf


(AST/SGOT) 


  18 U/L (15-37) 


  


  


 


 


Alanine Aminotransferase


(ALT/SGPT) 


  18 U/L (16-63) 


  


  


 


 


Alkaline Phosphatase


  


  77 U/L


() 


  


 


 


Total Protein


  


  6.6 g/dL


(6.4-8.2) 


  


 


 


Albumin


  


  3.0 g/dL


(3.4-5.0) 


  


 


 


Albumin/Globulin Ratio  0.8 (1.0-1.7)   


 


Thyroid Stimulating Hormone


(TSH) 


  2.159 uIU/mL


(0.358-3.74) 


  


 


 


Magnesium Level


  


  


  2.0 mg/dL


(1.8-2.4) 2.0 mg/dL


(1.8-2.4)


 


Digoxin Level


  


  


  1.0 ng/mL


(0.9-2.0) 


 


 


Digoxin Last Dose Date   05/30/17  


 


Digoxin Last Dose Time   0900  


 


Test


  6/1/17


04:00 


  


  


 


 


White Blood Count


  7.7 x10^3/uL


(4.0-11.0) 


  


  


 


 


Red Blood Count


  3.66 x10^6/uL


(4.30-5.70) 


  


  


 


 


Hemoglobin


  10.9 g/dL


(13.0-17.5) 


  


  


 


 


Hematocrit


  31.8 %


(39.0-53.0) 


  


  


 


 


Mean Corpuscular Volume 87 fL ()    


 


Mean Corpuscular Hemoglobin 30 pg (25-35)    


 


Mean Corpuscular Hemoglobin


Concent 34 g/dL


(31-37) 


  


  


 


 


Red Cell Distribution Width


  12.4 %


(11.5-14.5) 


  


  


 


 


Platelet Count


  243 x10^3/uL


(140-400) 


  


  


 


 


Neutrophils (%) (Auto) 65 % (31-73)    


 


Lymphocytes (%) (Auto) 16 % (24-48)    


 


Monocytes (%) (Auto) 17 % (0-9)    


 


Eosinophils (%) (Auto) 2 % (0-3)    


 


Basophils (%) (Auto) 0 % (0-3)    


 


Neutrophils # (Auto)


  4.9 x10^3uL


(1.8-7.7) 


  


  


 


 


Lymphocytes # (Auto)


  1.2 x10^3/uL


(1.0-4.8) 


  


  


 


 


Monocytes # (Auto)


  1.3 x10^3/uL


(0.0-1.1) 


  


  


 


 


Eosinophils # (Auto)


  0.1 x10^3/uL


(0.0-0.7) 


  


  


 


 


Basophils # (Auto)


  0.0 x10^3/uL


(0.0-0.2) 


  


  


 








Laboratory Tests








Test


  6/1/17


03:58 6/1/17


04:00


 


Sodium Level


  141 mmol/L


(136-145) 


 


 


Potassium Level


  3.9 mmol/L


(3.5-5.1) 


 


 


Chloride Level


  106 mmol/L


() 


 


 


Carbon Dioxide Level


  27 mmol/L


(21-32) 


 


 


Anion Gap 8 (6-14)  


 


Blood Urea Nitrogen 9 mg/dL (8-26)  


 


Creatinine


  1.0 mg/dL


(0.7-1.3) 


 


 


Estimated GFR


(Cockcroft-Gault) 72.8 


  


 


 


Glucose Level


  117 mg/dL


(70-99) 


 


 


Calcium Level


  7.8 mg/dL


(8.5-10.1) 


 


 


Magnesium Level


  2.0 mg/dL


(1.8-2.4) 


 


 


White Blood Count


  


  7.7 x10^3/uL


(4.0-11.0)


 


Red Blood Count


  


  3.66 x10^6/uL


(4.30-5.70)


 


Hemoglobin


  


  10.9 g/dL


(13.0-17.5)


 


Hematocrit


  


  31.8 %


(39.0-53.0)


 


Mean Corpuscular Volume  87 fL () 


 


Mean Corpuscular Hemoglobin  30 pg (25-35) 


 


Mean Corpuscular Hemoglobin


Concent 


  34 g/dL


(31-37)


 


Red Cell Distribution Width


  


  12.4 %


(11.5-14.5)


 


Platelet Count


  


  243 x10^3/uL


(140-400)


 


Neutrophils (%) (Auto)  65 % (31-73) 


 


Lymphocytes (%) (Auto)  16 % (24-48) 


 


Monocytes (%) (Auto)  17 % (0-9) 


 


Eosinophils (%) (Auto)  2 % (0-3) 


 


Basophils (%) (Auto)  0 % (0-3) 


 


Neutrophils # (Auto)


  


  4.9 x10^3uL


(1.8-7.7)


 


Lymphocytes # (Auto)


  


  1.2 x10^3/uL


(1.0-4.8)


 


Monocytes # (Auto)


  


  1.3 x10^3/uL


(0.0-1.1)


 


Eosinophils # (Auto)


  


  0.1 x10^3/uL


(0.0-0.7)


 


Basophils # (Auto)


  


  0.0 x10^3/uL


(0.0-0.2)








Microbiology


5/30/17 Blood Culture - Preliminary, Resulted


          NO GROWTH AFTER 1 DAY


Medications





Current Medications


Digoxin (Lanoxin) 125 mcg DAILY PO  Last administered on 6/1/17at 02:37;  Start 

5/31/17 at 09:00


Pantoprazole Sodium (Protonix) 40 mg DAILYAC PO  Last administered on 5/31/17at 

09:12;  Start 5/31/17 at 07:30;  Stop 5/31/17 at 10:17;  Status DC


Famotidine (Pepcid) 40 mg QHS PO  Last administered on 5/31/17at 20:42;  Start 5 /30/17 at 21:00


Propranolol HCl (Inderal) 20 mg BID PO  Last administered on 6/1/17at 08:37;  

Start 5/30/17 at 21:00


Acetaminophen (Tylenol) 650 mg PRN Q6HRS  PRN PO MILD PAIN / TEMP Last 

administered on 6/1/17at 02:37;  Start 5/30/17 at 16:00


Dextrose/Sodium Chloride 1,000 ml @  75 mls/hr [id number] IV  Last administered on 

5/31/17at 05:17;  Start 5/30/17 at 16:30;  Stop 5/31/17 at 10:17;  Status DC


Metronidazole 100 ml @  100 mls/hr Q8HRS IV  Last administered on 5/31/17at 05:

15;  Start 5/30/17 at 20:00;  Stop 5/31/17 at 10:17;  Status DC


Vancomycin HCl 125 mg [id number] PO  Last administered on 5/31/17at 09:12;  Start 5 /31/17 at 09:00;  Stop 5/31/17 at 10:17;  Status DC


Fidaxomicin (Dificid) 200 mg BID PO  Last administered on 6/1/17at 08:35;  

Start 5/31/17 at 10:00


Lactobacillus Acidophilus (Bacid, Jagruti-Bid) 1 tab TIDWMEALS PO  Last 

administered on 6/1/17at 08:35;  Start 5/31/17 at 12:00


Potassium Chloride (Klor-Con) 40 meq 1X  ONCE PO  Last administered on 5/31/ 17at 11:00;  Start 5/31/17 at 10:30;  Stop 5/31/17 at 10:31;  Status DC


Potassium Chloride/Dextrose/ Sod Cl 1,000 ml @  60 mls/hr [id number] IV  Last 

administered on 5/31/17at 23:34;  Start 5/31/17 at 10:30


Vitals/I & O





Vital Sign - Last 24 Hours








 5/31/17 5/31/17 5/31/17 5/31/17





 15:00 19:00 20:00 20:43


 


Temp 98.2 98.4  





 98.2 98.4  


 


Pulse 58 66  60


 


Resp 16 22  


 


B/P (MAP) 100/47 (64) 107/50 (69)  107/50


 


Pulse Ox 94 93  


 


O2 Delivery Room Air Room Air Room Air 


 


    





    





 5/31/17 6/1/17 6/1/17 6/1/17





 23:00 02:37 02:48 07:00


 


Temp 98.2  98.3 97.7





 98.2  98.3 97.7


 


Pulse 58 83 83 60


 


Resp 22  22 18


 


B/P (MAP) 108/54 (72) 143/72 142/72 (95) 116/56 (76)


 


Pulse Ox 96  97 95


 


O2 Delivery Room Air  Room Air Room Air


 


    





    





 6/1/17 6/1/17  





 08:37 10:44  


 


Temp  97.5  





  97.5  


 


Pulse 60 58  


 


Resp  16  


 


B/P (MAP) 116/56 111/50 (70)  


 


Pulse Ox  98  


 


O2 Delivery  Room Air  














Intake and Output   


 


 5/31/17 5/31/17 6/1/17





 15:00 23:00 07:00


 


Intake Total 800 ml 1400 ml 1235 ml


 


Balance 800 ml 1400 ml 1235 ml











Nutrition Consultation


Dietary Evaluation:


Recommendations by RD:  Add supplement feedings


Comments:  


boost breeze on clear liquids





boost plus/ ensure on fulls/ solid diet


Expected Outcomes/Goals:  


to meet > 75% est nutr needs


Interpretation of weight loss:  >5% in 1 month





Malnutrition Findings:


Food and Nutrition Intake (Sev:  <50% est energy req 5days


Body Fat Depletion (Non Severe:  Mild Depletion


Weight Status:  Underweight











GINO CAN MD Jun 1, 2017 11:33

## 2017-06-01 NOTE — CONS
DATE OF CONSULTATION:  05/31/2017



ROOM:  532.



REQUESTING PHYSICIAN:  Raza Salazar M.D.



REASON FOR CONSULTATION:  Fever, diarrhea, weight loss.



HISTORY OF PRESENT ILLNESS:  The patient is a very pleasant 76-year-old

 gentleman with a past medical history of some hypertension, although

he has recently been off his medications, tachycardia and gastroesophageal

reflux disease.  He went to the dentist approximately on 04/25, had 2 teeth

pulled and was placed on an antibiotic for a week, they believe, it was

clindamycin.  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

areas that had to be removed and began to have bloating and cramps.  He had

diarrhea, some nausea and had fevers, chills and sweats, temperatures up to a

low grade 99s.  He presented to Dr. Salazar's office yesterday and was admitted.

 I was consulted and not giving the etiology as of yet recommended

ciprofloxacin, oral vancomycin and Flagyl to _____ the nurse.  Dr. Salazar later

found that he was positive for C. diff colitis and continued the Flagyl and

vancomycin.  Currently, the patient is sitting up in bed, states he is feeling

somewhat better, not having any headaches.  No sinus issues, sore throat or

cough.  No chest pain.  No dysuria, frequency or urgency.  No falls or trauma.



PAST MEDICAL HISTORY:  Again for tachycardia, hypertension and gastroesophageal

reflux disease.



PAST SURGICAL HISTORY:  Positive for previous tonsillectomy and appendectomy.



ALLERGIES:  LISTED AS PENICILLIN, BUT HE HAS TOLERATED AMOXICILLIN WITHOUT

COMPLICATIONS.



SOCIAL HISTORY:  No tobacco, no alcohol.



FAMILY HISTORY:  He had a daughter who passed away from a GYN cancer.



CURRENT MEDICATIONS:  Include metronidazole, digoxin, Pepcid, Protonix, Inderal

and oral vancomycin.



PHYSICAL EXAMINATION:

VITAL SIGNS:  T-max of 100.6, currently 99; pulse 59; respirations 16; blood

pressure 104/44; satting 93% on room air.

CONSTITUTIONAL:  He is very pleasant, he is cooperative.  He is in no acute

distress.  He wears glasses.

HEENT:  Pupils are equal and reactive, questionable early cataracts.  Oral

cavity, pharynx is clear.

NECK:  Supple, good range of motion.

LUNGS:  Clear to auscultation bilaterally.

HEART:  S1, S2.

ABDOMEN:  Soft, nontender, nondistended, positive bowel sounds.

EXTREMITIES:  No clubbing, cyanosis or gross edema.

SKIN:  Warm to touch without signs of rash.

NEUROLOGIC:  He is nonfocal.

PSYCHIATRIC:  Affect is pleasant.



LABORATORY VALUES:  White count 12.2, hemoglobin 11.9, platelets of 284,

neutrophils are 71, lymphs are 11.  Glucose 129.  Normal liver function study

tests.  Creatinine of 1.1.  Urinalysis is clean.  C. diff is positive.  Chest

x-ray:  No acute process, but some hyperinflated lungs.



IMPRESSION:

1.  Clostridium difficile positive, 05/30.  He does have a daughter who works at

a assisted home.

2.  Fever.

3.  Leukocytosis.

4.  PENICILLIN ALLERGY, has tolerated amoxicillin.



RECOMMENDATIONS:  At this time, we will change to Dificid given his frail

nature, he is only 126 pounds, likely at risk for increased reoccurrence.  Lab

probiotics have  to check out dipstick cost and he can be

discharged home when he is not at risk for dehydration.  This was just discussed

with his family.



Thank you for participating in the patient's care.  If you have any questions,

please do not hesitate to contact me.

 



______________________________

KE VALLES MD



DR:  [email or web address]  JOB#:  [id number] / [id number]

DD:  05/31/2017 09:11  DT:  06/01/2017 02:02

## 2017-06-02 VITALS
SYSTOLIC BLOOD PRESSURE: 107 MMHG | DIASTOLIC BLOOD PRESSURE: 61 MMHG | DIASTOLIC BLOOD PRESSURE: 61 MMHG | SYSTOLIC BLOOD PRESSURE: 107 MMHG

## 2017-06-02 VITALS — SYSTOLIC BLOOD PRESSURE: 115 MMHG | DIASTOLIC BLOOD PRESSURE: 62 MMHG

## 2017-06-02 VITALS — SYSTOLIC BLOOD PRESSURE: 107 MMHG | DIASTOLIC BLOOD PRESSURE: 48 MMHG

## 2017-06-02 LAB
ANION GAP SERPL CALC-SCNC: 9 MMOL/L (ref 6–14)
BUN SERPL-MCNC: 7 MG/DL (ref 8–26)
CALCIUM SERPL-MCNC: 7.5 MG/DL (ref 8.5–10.1)
CHLORIDE SERPL-SCNC: 103 MMOL/L (ref 98–107)
CO2 SERPL-SCNC: 25 MMOL/L (ref 21–32)
CREAT SERPL-MCNC: 0.9 MG/DL (ref 0.7–1.3)
GFR SERPLBLD BASED ON 1.73 SQ M-ARVRAT: 82.3 ML/MIN
GLUCOSE SERPL-MCNC: 95 MG/DL (ref 70–99)
POTASSIUM SERPL-SCNC: 4 MMOL/L (ref 3.5–5.1)
SODIUM SERPL-SCNC: 137 MMOL/L (ref 136–145)

## 2017-06-02 RX ADMIN — FIDAXOMICIN SCH MG: 200 TABLET, FILM COATED ORAL at 10:12

## 2017-06-02 RX ADMIN — DEXTROSE, SODIUM CHLORIDE, AND POTASSIUM CHLORIDE SCH MLS/HR: 5; .45; .15 INJECTION INTRAVENOUS at 03:09

## 2017-06-02 RX ADMIN — DIGOXIN SCH MCG: 125 TABLET ORAL at 10:13

## 2017-06-02 RX ADMIN — PROPRANOLOL HYDROCHLORIDE SCH MG: 10 TABLET ORAL at 10:13

## 2017-06-02 RX ADMIN — PROBIOTIC PRODUCT - TAB SCH TAB: TAB at 10:12

## 2017-06-02 RX ADMIN — PROBIOTIC PRODUCT - TAB SCH TAB: TAB at 12:54

## 2017-06-02 NOTE — PDOC
Provider Note


Provider Note


discharge summary dictated  #  [id number]











GINO CAN MD Jun 2, 2017 10:33

## 2017-06-02 NOTE — PDOC
PROGRESS NOTES


Subjective


Subjective


feels better. had 1 loose stool yesterday and none today and ate solid food 

breakfast. labs improved. ready for dismissal





Objective


Objective





Vital Signs








  Date Time  Temp Pulse Resp B/P (MAP) Pulse Ox O2 Delivery O2 Flow Rate FiO2


 


6/2/17 10:13  64  115/62    


 


6/2/17 07:00 98.5  18  94 Room Air  





 98.5       














Intake and Output 


 


 6/2/17





 07:00


 


Intake Total 3292 ml


 


Balance 3292 ml


 


 


 


Intake Oral 2700 ml


 


IV Total 592 ml


 


# Voids 3











Physical Exam


Abdomen:  Soft


Heart:  Regular rate, Normal S1, Normal S2


Extremities:  No edema


General:  Alert


HEENT:  Atraumatic


Lungs:  Clear to auscultation


Neuro:  Normal speech


Psych/Mental Status:  Mental status NL


Skin:  No rashes





Assessment


Assessment


1.  Severe Clostridium difficile colitis. better


2.  Weight loss secondary to Clostridium difficile colitis.


3.  Hypokalemia secondary to diarrhea. resolved


4.  Gastroesophageal reflux disease.


5.  Hypertension.





Plan


Plan of Care


dismiss today on dificid


will switch to oral vancomycin if insurance does not cover dificid





Comment


Review of Relevant


I have reviewed the following items enrrique (where applicable) has been applied.


Labs





Laboratory Tests








Test


  6/1/17


03:58 6/1/17


04:00 6/2/17


05:30


 


Sodium Level


  141 mmol/L


(136-145) 


  137 mmol/L


(136-145)


 


Potassium Level


  3.9 mmol/L


(3.5-5.1) 


  4.0 mmol/L


(3.5-5.1)


 


Chloride Level


  106 mmol/L


() 


  103 mmol/L


()


 


Carbon Dioxide Level


  27 mmol/L


(21-32) 


  25 mmol/L


(21-32)


 


Anion Gap 8 (6-14)   9 (6-14) 


 


Blood Urea Nitrogen 9 mg/dL (8-26)   7 mg/dL (8-26) 


 


Creatinine


  1.0 mg/dL


(0.7-1.3) 


  0.9 mg/dL


(0.7-1.3)


 


Estimated GFR


(Cockcroft-Gault) 72.8 


  


  82.3 


 


 


Glucose Level


  117 mg/dL


(70-99) 


  95 mg/dL


(70-99)


 


Calcium Level


  7.8 mg/dL


(8.5-10.1) 


  7.5 mg/dL


(8.5-10.1)


 


Magnesium Level


  2.0 mg/dL


(1.8-2.4) 


  


 


 


White Blood Count


  


  7.7 x10^3/uL


(4.0-11.0) 


 


 


Red Blood Count


  


  3.66 x10^6/uL


(4.30-5.70) 


 


 


Hemoglobin


  


  10.9 g/dL


(13.0-17.5) 


 


 


Hematocrit


  


  31.8 %


(39.0-53.0) 


 


 


Mean Corpuscular Volume  87 fL ()  


 


Mean Corpuscular Hemoglobin  30 pg (25-35)  


 


Mean Corpuscular Hemoglobin


Concent 


  34 g/dL


(31-37) 


 


 


Red Cell Distribution Width


  


  12.4 %


(11.5-14.5) 


 


 


Platelet Count


  


  243 x10^3/uL


(140-400) 


 


 


Neutrophils (%) (Auto)  65 % (31-73)  


 


Lymphocytes (%) (Auto)  16 % (24-48)  


 


Monocytes (%) (Auto)  17 % (0-9)  


 


Eosinophils (%) (Auto)  2 % (0-3)  


 


Basophils (%) (Auto)  0 % (0-3)  


 


Neutrophils # (Auto)


  


  4.9 x10^3uL


(1.8-7.7) 


 


 


Lymphocytes # (Auto)


  


  1.2 x10^3/uL


(1.0-4.8) 


 


 


Monocytes # (Auto)


  


  1.3 x10^3/uL


(0.0-1.1) 


 


 


Eosinophils # (Auto)


  


  0.1 x10^3/uL


(0.0-0.7) 


 


 


Basophils # (Auto)


  


  0.0 x10^3/uL


(0.0-0.2) 


 








Laboratory Tests








Test


  6/2/17


05:30


 


Sodium Level


  137 mmol/L


(136-145)


 


Potassium Level


  4.0 mmol/L


(3.5-5.1)


 


Chloride Level


  103 mmol/L


()


 


Carbon Dioxide Level


  25 mmol/L


(21-32)


 


Anion Gap 9 (6-14) 


 


Blood Urea Nitrogen 7 mg/dL (8-26) 


 


Creatinine


  0.9 mg/dL


(0.7-1.3)


 


Estimated GFR


(Cockcroft-Gault) 82.3 


 


 


Glucose Level


  95 mg/dL


(70-99)


 


Calcium Level


  7.5 mg/dL


(8.5-10.1)








Microbiology


5/30/17 Blood Culture - Preliminary, Resulted


          NO GROWTH AFTER 2 DAYS


Medications





Current Medications


Digoxin (Lanoxin) 125 mcg DAILY PO  Last administered on 6/2/17at 10:13;  Start 

5/31/17 at 09:00


Pantoprazole Sodium (Protonix) 40 mg DAILYAC PO  Last administered on 5/31/17at 

09:12;  Start 5/31/17 at 07:30;  Stop 5/31/17 at 10:17;  Status DC


Famotidine (Pepcid) 40 mg QHS PO  Last administered on 6/1/17at 20:25;  Start 5/ 30/17 at 21:00


Propranolol HCl (Inderal) 20 mg BID PO  Last administered on 6/2/17at 10:13;  

Start 5/30/17 at 21:00


Acetaminophen (Tylenol) 650 mg PRN Q6HRS  PRN PO MILD PAIN / TEMP Last 

administered on 6/1/17at 16:15;  Start 5/30/17 at 16:00


Dextrose/Sodium Chloride 1,000 ml @  75 mls/hr [id number] IV  Last administered on 

5/31/17at 05:17;  Start 5/30/17 at 16:30;  Stop 5/31/17 at 10:17;  Status DC


Metronidazole 100 ml @  100 mls/hr Q8HRS IV  Last administered on 5/31/17at 05:

15;  Start 5/30/17 at 20:00;  Stop 5/31/17 at 10:17;  Status DC


Vancomycin HCl 125 mg [id number] PO  Last administered on 5/31/17at 09:12;  Start 5 /31/17 at 09:00;  Stop 5/31/17 at 10:17;  Status DC


Fidaxomicin (Dificid) 200 mg BID PO  Last administered on 6/2/17at 10:12;  

Start 5/31/17 at 10:00


Lactobacillus Acidophilus (Bacid, Jagruti-Bid) 1 tab TIDWMEALS PO  Last 

administered on 6/2/17at 10:12;  Start 5/31/17 at 12:00


Potassium Chloride (Klor-Con) 40 meq 1X  ONCE PO  Last administered on 5/31/ 17at 11:00;  Start 5/31/17 at 10:30;  Stop 5/31/17 at 10:31;  Status DC


Potassium Chloride/Dextrose/ Sod Cl 1,000 ml @  60 mls/hr [id number] IV  Last 

administered on 6/2/17at 03:09;  Start 5/31/17 at 10:30


Vitals/I & O





Vital Sign - Last 24 Hours








 6/1/17 6/1/17 6/1/17 6/1/17





 10:44 15:00 19:00 19:48


 


Temp 97.5 98.1 97.3 





 97.5 98.1 97.3 


 


Pulse 58 66 67 


 


Resp 16 16 18 


 


B/P (MAP) 111/50 (70) 106/52 (70) 110/62 (78) 


 


Pulse Ox 98 96 96 


 


O2 Delivery Room Air Room Air Room Air Room Air


 


    





    





 6/1/17 6/1/17 6/2/17 6/2/17





 20:26 23:00 03:00 07:00


 


Temp  96.7 98.0 98.5





  96.7 98.0 98.5


 


Pulse 67 67 68 64


 


Resp  20 20 18


 


B/P (MAP) 110/62 132/60 (84) 107/48 (67) 115/62 (79)


 


Pulse Ox  95 98 94


 


O2 Delivery  Room Air Room Air Room Air


 


    





    





 6/2/17 6/2/17  





 10:13 10:13  


 


Pulse 64 64  


 


B/P (MAP) 115/62 115/62  














Intake and Output   


 


 6/1/17 6/1/17 6/2/17





 15:00 23:00 07:00


 


Intake Total 1285 ml 1807 ml 200 ml


 


Balance 1285 ml 1807 ml 200 ml











Nutrition Consultation


Dietary Evaluation:


Recommendations by RD:  Add supplement feedings


Comments:  


boost breeze on clear liquids





boost plus/ ensure on fulls/ solid diet


Expected Outcomes/Goals:  


to meet > 75% est nutr needs


Interpretation of weight loss:  >5% in 1 month





Malnutrition Findings:


Food and Nutrition Intake (Sev:  <50% est energy req 5days


Body Fat Depletion (Non Severe:  Mild Depletion


Weight Status:  Underweight











GINO CAN MD Jun 2, 2017 10:20

## 2017-06-02 NOTE — PDOC
Subjective:


Subjective:


Feels better.  Less diarrhea.  Less abd discomfort.  Ready to leave.


Daughter calling pharmacies re: vanco Rx, having some difficulty.  Asks if they 

can have  Dificid pill to take with them.





Objective:


Vital Signs:





 Vital Signs








  Date Time  Temp Pulse Resp B/P (MAP) Pulse Ox O2 Delivery O2 Flow Rate FiO2


 


6/2/17 11:14 98.4 61 18 107/61 (76) 95 Room Air  





 98.4       








Labs:





Laboratory Tests








Test


  6/2/17


05:30


 


Sodium Level 137 mmol/L 


 


Potassium Level 4.0 mmol/L 


 


Chloride Level 103 mmol/L 


 


Carbon Dioxide Level 25 mmol/L 


 


Anion Gap 9 


 


Blood Urea Nitrogen 7 mg/dL 


 


Creatinine 0.9 mg/dL 


 


Estimated GFR


(Cockcroft-Gault) 82.3 


 


 


Glucose Level 95 mg/dL 


 


Calcium Level 7.5 mg/dL 











PE:





GEN: NAD


LUNGS: clear anteriorly


HEART: RRR


ABD: NABS, S/ND/NT


NEURO/PSYCH: A & O 3





A/P:


C Diff


-on Dificid here w/ plans to change to PO vanco on DC





--


Improved w/ atbx, continue per ID.  


DC per primary.


Consider outpt screening colonoscopy.











MARGARITO ARRIAGA Jun 2, 2017 12:01

## 2017-06-02 NOTE — DISCH
DISCHARGE INSTRUCTIONS


Condition on Discharge


Condition on Discharge:  Stable





Activity After Discharge


Activity Instructions for Disc:  Resume previous activity





Diet after Discharge


Diet after Discharge:  Regular





Contacting the DRMichelle after DC


Call your doctor for:  If your condition worsens





Follow-Up


Follow up with:  dr. can next week











GINO CAN MD Jun 2, 2017 10:27

## 2017-06-02 NOTE — DS
DATE OF DISCHARGE:  06/02/2017



FINAL DIAGNOSES:

1.  Severe Clostridium difficile colitis.

2.  Dehydration and weight loss secondary to Clostridium difficile colitis.

3.  Hypertension.

4.  History of paroxysmal supraventricular tachycardia.

5.  Gastroesophageal reflux disease.



HOSPITAL COURSE:  The patient is a 76-year-old white male who has a 9-day

history of liquid diarrhea having multiple _____ loose stools a day.  He had

bilateral abdominal cramps without any vomiting.  He has not been eating and

drinking very well.  His appetite has been decreasing, lost 14 pounds during

this period of time.  He said he had a colonoscopy a few years ago, which was

unremarkable.  He was admitted, seen in my office on the same day, admitted to

[facility] on 05/30/2017 and was started on IV fluids.  His stool

was positive for Clostridium difficile toxin and his IV Flagyl was discontinued.

 He was started on Dificid b.i.d. and also received IV fluids.  His renal

function and hydration status improved.  His diet was advanced from a clear

liquid to full liquid to solid food diet.  His _____ insurance was not going to

cover the Dificid and he will be switched to liquid vancomycin 125 mg q.i.d. and

his lactobacilli will be continued also.  His blood pressure was low and his

losartan and amlodipine were discontinued.  He therefore will be dismissed to

home with vancomycin 125 mg liquid q.i.d. for 10 days, lactobacilli one t.i.d.

for 10 days, digoxin 125 mcg daily, propranolol 20 mg b.i.d., ranitidine 300 mg

at bedtime as his Protonix was discontinued also and Xanax 0.25 mg at bedtime

p.r.n.  He is told to make an appointment to see Dr. Salazar next week.

 



______________________________

GINO SALAZAR MD DR:  ELENA/kendall  JOB#:  [id number] / [id number]

DD:  06/02/2017 10:32  DT:  06/02/2017 21:06

## 2019-10-29 ENCOUNTER — HOSPITAL ENCOUNTER (OUTPATIENT)
Dept: HOSPITAL 61 - RAD | Age: 79
Discharge: HOME | End: 2019-10-29
Attending: INTERNAL MEDICINE
Payer: COMMERCIAL

## 2019-10-29 DIAGNOSIS — M25.462: Primary | ICD-10-CM

## 2019-10-29 PROCEDURE — 73562 X-RAY EXAM OF KNEE 3: CPT

## 2019-10-29 PROCEDURE — 73590 X-RAY EXAM OF LOWER LEG: CPT
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
